# Patient Record
Sex: MALE | Race: WHITE | URBAN - METROPOLITAN AREA
[De-identification: names, ages, dates, MRNs, and addresses within clinical notes are randomized per-mention and may not be internally consistent; named-entity substitution may affect disease eponyms.]

---

## 2024-02-24 ENCOUNTER — ATHLETIC TRAINING (OUTPATIENT)
Dept: SPORTS MEDICINE | Facility: OTHER | Age: 29
End: 2024-02-24

## 2024-02-24 VITALS — WEIGHT: 200 LBS | HEIGHT: 75 IN | BODY MASS INDEX: 24.87 KG/M2

## 2024-02-24 DIAGNOSIS — S83.412A SPRAIN OF MEDIAL COLLATERAL LIGAMENT OF LEFT KNEE, INITIAL ENCOUNTER: Primary | ICD-10-CM

## 2024-02-24 NOTE — PROGRESS NOTES
During a game on 2/23/24, he collided with another player with knee on knee contact.  He was in immediate visible discomfort and was unable to continue playing.  In the locker room, he presented with a positive Valgus stress test, but negative anterior drawer and lachman's.  No swelling, pain is localized over the medial joint line of the left knee.  He has a history of a previous MCL sprain on this knee and states that this feels similar.  He was wearing a [employer] playmaker brace at the time of injury.

## 2024-02-27 ENCOUNTER — HOSPITAL ENCOUNTER (OUTPATIENT)
Dept: RADIOLOGY | Facility: CLINIC | Age: 29
Discharge: HOME | End: 2024-02-27
Payer: OTHER MISCELLANEOUS

## 2024-02-27 ENCOUNTER — HOSPITAL ENCOUNTER (OUTPATIENT)
Dept: MRI IMAGING | Facility: HOSPITAL | Age: 29
Discharge: HOME | End: 2024-02-27
Attending: STUDENT IN AN ORGANIZED HEALTH CARE EDUCATION/TRAINING PROGRAM
Payer: OTHER MISCELLANEOUS

## 2024-02-27 VITALS
HEART RATE: 79 BPM | SYSTOLIC BLOOD PRESSURE: 122 MMHG | TEMPERATURE: 97.8 F | HEIGHT: 75 IN | DIASTOLIC BLOOD PRESSURE: 72 MMHG | BODY MASS INDEX: 25.17 KG/M2 | WEIGHT: 202.4 LBS

## 2024-02-27 DIAGNOSIS — S89.92XA INJURY OF LEFT KNEE, INITIAL ENCOUNTER: Primary | ICD-10-CM

## 2024-02-27 DIAGNOSIS — M25.562 ACUTE PAIN OF LEFT KNEE: ICD-10-CM

## 2024-02-27 DIAGNOSIS — S89.92XA INJURY OF LEFT KNEE, INITIAL ENCOUNTER: ICD-10-CM

## 2024-02-27 DIAGNOSIS — M23.92 INTERNAL DERANGEMENT OF LEFT KNEE: ICD-10-CM

## 2024-02-27 DIAGNOSIS — S83.412A SPRAIN OF MEDIAL COLLATERAL LIGAMENT OF LEFT KNEE, INITIAL ENCOUNTER: ICD-10-CM

## 2024-02-27 PROCEDURE — 73721 MRI JNT OF LWR EXTRE W/O DYE: CPT

## 2024-02-27 PROCEDURE — 99204 OFFICE O/P NEW MOD 45 MIN: CPT | Performed by: STUDENT IN AN ORGANIZED HEALTH CARE EDUCATION/TRAINING PROGRAM

## 2024-02-27 PROCEDURE — 73564 X-RAY EXAM KNEE 4 OR MORE: CPT

## 2024-02-27 NOTE — PROGRESS NOTES
1. Injury of left knee, initial encounter  XR knee 4+ vw left injury    MRI knee left  wo contrast      2. Acute pain of left knee  XR knee 4+ vw left injury    MRI knee left  wo contrast      3. Internal derangement of left knee  MRI knee left  wo contrast        Orders Placed This Encounter   Procedures    XR knee 4+ vw left injury    MRI knee left  wo contrast        Imaging Studies (I personally reviewed images in PACS and report):    X-ray left knee 2/27/2024: No acute osseous abnormalities.  No significant degenerative changes.    IMPRESSION:  Acute traumatic left knee pain  During ice hockey match reportedly collided against another player against his knee causing difficulty with weightbearing  Symptoms mildly improving today but continues to have aggravating pain over the medial joint line/tibial plateau  Radiographs unremarkable  Differential includes bone bruise versus MCL sprain versus medial meniscal injury    Date of Injury: 2/23/2024    Other factors:  History of MCL sprain of his left knee in the past    PLAN:    Clinical exam and radiographic imaging reviewed with patient today, with impression as per above. I have discussed with the patient the pathophysiology of this diagnosis and reviewed how the examination correlates with this diagnosis.    Radiographs of his left knee obtained today as noted above.  I will follow-up official radiology interpretation.    Given the mechanism of injury, I have ordered an MRI of his left knee without contrast for further evaluation.  Can continue use of his hinged knee brace while weightbearing as tolerated on his left lower extremity.  In regards to pain control I counseled as needed use of acetaminophen, NSAIDs, heat/ice therapy and rehabbing with his .  Plan will follow-up after MRI of his left knee to determine treatment plan going forward.    Return for F/u after MRI of left knee.    Portions of the record may have been created with voice  "recognition software. Occasional wrong word or \"sound a like\" substitutions may have occurred due to the inherent limitations of voice recognition software. Read the chart carefully and recognize, using context, where substitutions have occurred.     CHIEF COMPLAINT:  Chief Complaint   Patient presents with    Left Knee - Pain         HPI:  Blu Medrano is a 28 y.o. 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  He states his MCL sprain was from years ago and denies prior surgeries of his left knee in the past.  He reports a new injury of his left knee on 2/23/2024 after colliding knee to knee against an opponent.  Reports immediate pain and inability to continue participating.  Patient reports today that he continues to have pain mainly over the anteromedial/medial aspect of his knee.  He reports stiffness with range of motion movements especially with flexion.  He states the initial swelling and bruising has receded.  He reports intermittent crepitus of his knee but thinks he has always had crepitus of his knee in the past.  He states with use of the brace it does provide support for his knee as he does have a sensation of giving out medially.  In regards to pain control he has been using Tylenol, icing/heat and rehabbing with his .        Medical, Surgical, Family, and Social History    History reviewed. No pertinent past medical history.  History reviewed. No pertinent surgical history.  Social History   Social History     Substance and Sexual Activity   Alcohol Use Never     Social History     Substance and Sexual Activity   Drug Use Never     Social History     Tobacco Use   Smoking Status Never   Smokeless Tobacco Never     History reviewed. No pertinent family history.  No " "Known Allergies       Physical Exam  /72   Pulse 79   Temp 97.8 °F (36.6 °C) (Temporal)   Ht 6' 3\" (1.905 m)   Wt 91.8 kg (202 lb 6.4 oz)   BMI 25.30 kg/m²     Constitutional:  see vital signs  Gen: well-developed, normocephalic/atraumatic, well-groomed  Eyes: No inflammation or discharge of conjunctiva or lids; sclera clear   Pulmonary/Chest: Effort normal. No respiratory distress.     Ortho Exam  Left Knee Exam:  Erythema: no  Swelling: no  Increased Warmth: no  Tenderness: +MJL, medial tibial plateau  ROM: 0-130  Knee flexion strength: 5/5  Knee extension strength: 5/5  Patellar Grind: negative  Lachman's: negative  Anterior Drawer: negative  Posterior Drawer:  Varus laxity: negative  Valgus laxity: +  [facility]: +   Thessaly Test:+    No calf tenderness to palpation     Gait: Normal without antalgia while using hinged knee brace      Procedures        "

## 2024-02-27 NOTE — PROGRESS NOTES
Called patient in regards to findings of his MRI noting complete, grade 3 MCL tear of his knee. Discussed that this can be potentially treated conservatively with use of his hinged knee brace, rehab to get his motion improved, typically by 3-4 weeks post-injury; afterwards can work on sport related movement of his knee as tolerated and progressively return to his sport at 5-7 weeks. However, I did offer referral to orthopedic sports medicine surgery as well for a second opinion to discuss possible surgical intervention too. Patient agreeable to discuss with ortho surgery and referral to Dr. Sheridan placed today.

## 2024-02-29 ENCOUNTER — OFFICE VISIT (OUTPATIENT)
Age: 29
End: 2024-02-29

## 2024-02-29 VITALS
HEIGHT: 75 IN | WEIGHT: 202 LBS | HEART RATE: 75 BPM | BODY MASS INDEX: 25.12 KG/M2 | SYSTOLIC BLOOD PRESSURE: 148 MMHG | DIASTOLIC BLOOD PRESSURE: 82 MMHG

## 2024-02-29 DIAGNOSIS — S89.92XA INJURY OF LEFT KNEE, INITIAL ENCOUNTER: ICD-10-CM

## 2024-02-29 DIAGNOSIS — M25.562 ACUTE PAIN OF LEFT KNEE: ICD-10-CM

## 2024-02-29 DIAGNOSIS — S83.412A SPRAIN OF MEDIAL COLLATERAL LIGAMENT OF LEFT KNEE, INITIAL ENCOUNTER: ICD-10-CM

## 2024-02-29 PROCEDURE — 99204 OFFICE O/P NEW MOD 45 MIN: CPT | Performed by: ORTHOPAEDIC SURGERY

## 2024-02-29 NOTE — PROGRESS NOTES
CHIEF COMPLAINT/REASON FOR VISIT  Chief Complaint   Patient presents with    Left Knee - Follow-up        HISTORY OF PRESENT ILLNESS  Blu Medrano is a 28 y.o. male who presents for evaluation of his left knee. Patient said he injured his left knee last week. He said he injured it when he collided knee to knee with another player causing his knee to twist out. He said he immediately experienced pain. He said has been wearing a brace which does help. Patient does mention he has always had problems with his MCL for years as he has injured this multiple times in the past. Of note, patient plays defense in hockey.    REVIEW OF SYSTEMS  Review of systems was performed and, outside that mentioned in the HPI, it was negative for symptomology related to the integumentary, hematologic, immunologic, allergic, neurologic, cardiovascular, respiratory, GI or  systems.    MEDICAL HISTORY  Patient Active Problem List   Diagnosis    Sprain of medial collateral ligament of left knee       SURGICAL HISTORY  History reviewed. No pertinent surgical history.    CURRENT MEDICATIONS  No current outpatient medications on file.    SOCIAL HISTORY  Social History     Socioeconomic History    Marital status: Unknown     Spouse name: Not on file    Number of children: Not on file    Years of education: Not on file    Highest education level: Not on file   Occupational History    Not on file   Tobacco Use    Smoking status: Never     Passive exposure: Never    Smokeless tobacco: Never   Vaping Use    Vaping status: Never Used   Substance and Sexual Activity    Alcohol use: Never    Drug use: Never    Sexual activity: Not on file   Other Topics Concern    Not on file   Social History Narrative    Not on file     Social Determinants of Health     Financial Resource Strain: Not on file   Food Insecurity: Not on file   Transportation Needs: Not on file   Physical Activity: Not on file   Stress: Not on file   Social Connections: Not on file  "  Intimate Partner Violence: Not on file   Housing Stability: Not on file       Objective     VITAL SIGNS  /82   Pulse 75   Ht 6' 3\" (1.905 m)   Wt 91.6 kg (202 lb)   BMI 25.25 kg/m²      PHYSICAL EXAMINATION    Musculoskeletal: Left Knee Examination:  General: The patient is alert, oriented, and pleasant to interact with.  Patient ambulates with Antalgic gait pattern  Assistive Device: brace  Alignment: normal  Skin is warm and dry to touch with no signs of erythema, ecchymosis, or infection   Effusion: none  ROM: 0° - 120°  verus 0° - 135° on contralateral side  MMT: 5/5 throughout  TTP: MCL origin on femur  Flexor and extensor mechanisms are intact   Valgus Stress 2B  Varus Stress 1A  Odilon's Test Negative    Lachman's Test - 1A  2+ DP and PT pulses with brisk capillary refill to the toes  Sural, saphenous, tibial, superficial, and deep peroneal motor and sensory distributions intact  Sensation light touch intact distally    RADIOGRAPHIC EXAMINATION/DIAGNOSTICS:  Procedure: MRI knee left  wo contrast    Result Date: 2/27/2024  Narrative: MRI LEFT KNEE INDICATION:   S89.92XA: Unspecified injury of left lower leg, initial encounter M25.562: Pain in left knee M23.92: Unspecified internal derangement of left knee. COMPARISON: Left knee radiograph 2/27/2024 TECHNIQUE: Multiplanar/multisequence MR of the left knee was performed. FINDINGS: SUBCUTANEOUS TISSUES: Medial edema. JOINT EFFUSION: None. BAKER'S CYST: None. MENISCI: Intact. CRUCIATE LIGAMENTS: Intact. EXTENSOR APPARATUS: Intact. COLLATERAL LIGAMENTS: Complete tear of the superficial fibers of the medial collateral ligament at the femoral attachment (MCL, images #2/12 and #6/19). Otherwise intact collateral ligaments. ARTICULAR SURFACES: Normal. BONES: Normal. MUSCULATURE: Intact.     Impression: Grade 3 sprain (complete tear) of the proximal MCL. Workstation performed: [id number]     Procedure: XR knee 4+ vw left injury    Result Date: " 2/27/2024  Narrative: LEFT KNEE INDICATION:   Unspecified injury of left lower leg, initial encounter. Pain in left knee. COMPARISON:  None. VIEWS:  XR KNEE 4+ VW LEFT INJURY Images: 4 FINDINGS: There is no acute fracture or dislocation. There is no joint effusion. No significant degenerative changes. No lytic or blastic osseous lesion. Soft tissues are unremarkable.     Impression: No acute osseous abnormality. Electronically signed: 02/27/2024 01:58 PM Eduardo Vann, MPH,MD       MRI reviewed, demonstrates acute on chronic MCL sprain grade III/II from femoral attachment, medial epicondyle. No other abnormalities noted.     ASSESSMENT/PLAN:  Left knee pain;grade III MCL sprain/tear.     Today, we discussed conservative treatment options including but are certainly not limited to: Rest, ice, compression, elevation, activity modification, anti-inflammatory medication, physical therapy, and/or injections.  We discussed benefits of each potential treatment option.  After discussion, patient was agreeable to trying Rest, Ice, Compression, Elevation, Activity Modification, and Anti-inflammatory Medication. Advised it may take a couple months for this to heal on its own. We discuss that if he fails conservative measure we could consider surgical management. We will plan to follow up with the patient in 1 months.  They are understanding that if the pain should worsen or they develop new symptoms to please reach out to us sooner. Patient is understanding and agreeable to this plan.     Scribe Attestation      I,:  Leoncio Fitzpatrick PA-C am acting as a scribe while in the presence of the attending physician.:       I,:  Dany Sheridan MD personally performed the services described in this documentation    as scribed in my presence.:

## 2024-05-15 ENCOUNTER — TELEPHONE (OUTPATIENT)
Age: 29
End: 2024-05-15

## 2024-05-15 NOTE — TELEPHONE ENCOUNTER
Caller: Teofilo BROWN      Doctor/Office: Fatimah /     JOSE#: [phone]   - 4 wks 2-3 times a week     What needs to be faxed: They need a physical therapy order placed for patient to attend PT 2-3x's a week for 4 weeks     ATTN to:     Fax#: [phone]

## 2024-05-16 DIAGNOSIS — S83.412A SPRAIN OF MEDIAL COLLATERAL LIGAMENT OF LEFT KNEE, INITIAL ENCOUNTER: Primary | ICD-10-CM

## 2024-05-16 NOTE — TELEPHONE ENCOUNTER
Caller: James    Doctor: Fatimah    Reason for call: Confirming if patient can go anywhere with physical therapy referral or did it have to be [facility]. Made aware they can go to any office as long as they had the referral and insurance was accepted    Call back#: [phone]